# Patient Record
Sex: FEMALE | HISPANIC OR LATINO | URBAN - METROPOLITAN AREA
[De-identification: names, ages, dates, MRNs, and addresses within clinical notes are randomized per-mention and may not be internally consistent; named-entity substitution may affect disease eponyms.]

---

## 2019-05-15 ENCOUNTER — OFFICE VISIT (OUTPATIENT)
Dept: URBAN - METROPOLITAN AREA CLINIC 48 | Facility: CLINIC | Age: 74
End: 2019-05-15
Payer: MEDICARE

## 2019-05-15 DIAGNOSIS — E11.3393 TYPE 2 DIAB WITH MOD NONP RTNOP WITHOUT MACULAR EDEMA, BI: Primary | ICD-10-CM

## 2019-05-15 PROCEDURE — 92134 CPTRZ OPH DX IMG PST SGM RTA: CPT | Performed by: OPHTHALMOLOGY

## 2019-05-15 PROCEDURE — 99213 OFFICE O/P EST LOW 20 MIN: CPT | Performed by: OPHTHALMOLOGY

## 2019-05-15 ASSESSMENT — INTRAOCULAR PRESSURE
OD: 19
OS: 18

## 2019-05-15 NOTE — IMPRESSION/PLAN
Impression: Type 2 diab with mod nonp rtnop without macular edema, bi: [id number]. Plan: OCT ordered and performed today. Discussed diagnosis with patient. The clinical exam was consistent with Type 2 diabetes. The patient was advised to maintain tight blood sugar control, blood pressure and lipid control. Recommend patient follow up in 1 year with DFE. Patient was also advised to keep all appointments with PCP for diabetic evaluation and counseling to avoid the systemic complications of diabetes.

## 2021-04-02 ENCOUNTER — OFFICE VISIT (OUTPATIENT)
Dept: URBAN - METROPOLITAN AREA CLINIC 48 | Facility: CLINIC | Age: 76
End: 2021-04-02
Payer: MEDICARE

## 2021-04-02 DIAGNOSIS — E11.9 TYPE 2 DIABETES MELLITUS W/O COMPLICATION: Primary | ICD-10-CM

## 2021-04-02 PROCEDURE — 99214 OFFICE O/P EST MOD 30 MIN: CPT | Performed by: OPHTHALMOLOGY

## 2021-04-02 PROCEDURE — 92134 CPTRZ OPH DX IMG PST SGM RTA: CPT | Performed by: OPHTHALMOLOGY

## 2021-04-02 ASSESSMENT — INTRAOCULAR PRESSURE
OD: 19
OS: 21

## 2021-04-02 NOTE — IMPRESSION/PLAN
Impression: Type 2 diabetes mellitus w/o complication: X92.9. Plan: OCT ordered and performed today. Discussed diagnosis with patient. The clinical exam was consistent with Type 2 diabetes. The patient was advised to maintain tight blood sugar control, blood pressure and lipid control. Recommend patient follow up in 1 year with DFE. Patient was also advised to keep all appointments with PCP for diabetic evaluation and counseling to avoid the systemic complications of diabetes.

## 2022-05-12 ENCOUNTER — OFFICE VISIT (OUTPATIENT)
Dept: URBAN - METROPOLITAN AREA CLINIC 48 | Facility: CLINIC | Age: 77
End: 2022-05-12
Payer: MEDICARE

## 2022-05-12 DIAGNOSIS — E11.9 TYPE 2 DIABETES MELLITUS W/O COMPLICATION: Primary | ICD-10-CM

## 2022-05-12 PROCEDURE — 99213 OFFICE O/P EST LOW 20 MIN: CPT | Performed by: STUDENT IN AN ORGANIZED HEALTH CARE EDUCATION/TRAINING PROGRAM

## 2022-05-12 ASSESSMENT — KERATOMETRY
OS: 45.38
OD: 45.75

## 2022-05-12 ASSESSMENT — INTRAOCULAR PRESSURE
OD: 19
OS: 17

## 2022-05-12 NOTE — IMPRESSION/PLAN
Impression: Type 2 diabetes mellitus w/o complication: B56.0. Plan: - No retinopathy seen on exam today - List of common aft brand names provided for any dry eye symptoms [id number] OU
- Continue glucose, BP, and lipid control as per PCP
- Continue with annual DFE

## 2023-11-08 ENCOUNTER — OFFICE VISIT (OUTPATIENT)
Dept: URBAN - METROPOLITAN AREA CLINIC 48 | Facility: CLINIC | Age: 78
End: 2023-11-08
Payer: MEDICARE

## 2023-11-08 DIAGNOSIS — H43.813 VITREOUS DEGENERATION, BILATERAL: ICD-10-CM

## 2023-11-08 DIAGNOSIS — E11.9 TYPE 2 DIABETES MELLITUS W/O COMPLICATION: Primary | ICD-10-CM

## 2023-11-08 PROCEDURE — 92134 CPTRZ OPH DX IMG PST SGM RTA: CPT | Performed by: OPTOMETRIST

## 2023-11-08 PROCEDURE — 99204 OFFICE O/P NEW MOD 45 MIN: CPT | Performed by: OPTOMETRIST

## 2023-11-08 ASSESSMENT — INTRAOCULAR PRESSURE
OS: 17
OD: 18